# Patient Record
Sex: MALE | Race: WHITE | NOT HISPANIC OR LATINO | Employment: OTHER | URBAN - METROPOLITAN AREA
[De-identification: names, ages, dates, MRNs, and addresses within clinical notes are randomized per-mention and may not be internally consistent; named-entity substitution may affect disease eponyms.]

---

## 2022-02-26 ENCOUNTER — HOSPITAL ENCOUNTER (EMERGENCY)
Facility: HOSPITAL | Age: 72
Discharge: HOME | End: 2022-02-26
Attending: EMERGENCY MEDICINE
Payer: OTHER GOVERNMENT

## 2022-02-26 VITALS
DIASTOLIC BLOOD PRESSURE: 88 MMHG | HEART RATE: 60 BPM | SYSTOLIC BLOOD PRESSURE: 152 MMHG | RESPIRATION RATE: 16 BRPM | OXYGEN SATURATION: 98 % | WEIGHT: 165 LBS | TEMPERATURE: 98 F

## 2022-02-26 DIAGNOSIS — I10 HYPERTENSION: ICD-10-CM

## 2022-02-26 DIAGNOSIS — S60.221A CONTUSION OF RIGHT HAND, INITIAL ENCOUNTER: Primary | ICD-10-CM

## 2022-02-26 DIAGNOSIS — M19.031 ARTHRITIS OF RIGHT WRIST: ICD-10-CM

## 2022-02-26 LAB
ALBUMIN SERPL-MCNC: 4 G/DL (ref 3.3–5.5)
ALP SERPL-CCNC: 86 U/L (ref 42–141)
BILIRUB SERPL-MCNC: 0.7 MG/DL (ref 0.2–1.6)
BUN SERPL-MCNC: 11 MG/DL (ref 7–22)
CALCIUM SERPL-MCNC: 9.8 MG/DL (ref 8–10.3)
CHLORIDE SERPL-SCNC: 107 MMOL/L (ref 98–108)
CREAT SERPL-MCNC: 1.1 MG/DL (ref 0.6–1.2)
GLUCOSE SERPL-MCNC: 92 MG/DL (ref 73–118)
POC ALT (SGPT): 13 U/L (ref 10–47)
POC AST (SGOT): 28 U/L (ref 11–38)
POC CARDIAC TROPONIN I: 0 NG/ML
POC PTINR: 1 (ref 0.9–1.2)
POC PTWBT: 12.4 SEC (ref 9.7–14.3)
POC TCO2: 28 MMOL/L (ref 18–33)
POTASSIUM BLD-SCNC: 4.6 MMOL/L (ref 3.6–5.1)
PROTEIN, POC: 7.7 G/DL (ref 6.4–8.1)
SAMPLE: NORMAL
SAMPLE: NORMAL
SODIUM BLD-SCNC: 144 MMOL/L (ref 128–145)

## 2022-02-26 PROCEDURE — 85025 COMPLETE CBC W/AUTO DIFF WBC: CPT | Mod: ER

## 2022-02-26 PROCEDURE — 84484 ASSAY OF TROPONIN QUANT: CPT | Mod: ER

## 2022-02-26 PROCEDURE — 93005 ELECTROCARDIOGRAM TRACING: CPT | Mod: ER

## 2022-02-26 PROCEDURE — 99284 EMERGENCY DEPT VISIT MOD MDM: CPT | Mod: 25,ER

## 2022-02-26 PROCEDURE — 85610 PROTHROMBIN TIME: CPT | Mod: ER

## 2022-02-26 PROCEDURE — 80053 COMPREHEN METABOLIC PANEL: CPT | Mod: ER

## 2022-02-26 PROCEDURE — 25000003 PHARM REV CODE 250: Mod: ER | Performed by: EMERGENCY MEDICINE

## 2022-02-26 PROCEDURE — 93010 EKG 12-LEAD: ICD-10-PCS | Mod: ,,, | Performed by: INTERNAL MEDICINE

## 2022-02-26 PROCEDURE — 93010 ELECTROCARDIOGRAM REPORT: CPT | Mod: ,,, | Performed by: INTERNAL MEDICINE

## 2022-02-26 RX ORDER — DICLOFENAC SODIUM 10 MG/G
2 GEL TOPICAL 4 TIMES DAILY PRN
Qty: 200 G | Refills: 0 | Status: SHIPPED | OUTPATIENT
Start: 2022-02-26 | End: 2022-03-08

## 2022-02-26 RX ORDER — CYCLOBENZAPRINE HCL 10 MG
10 TABLET ORAL 3 TIMES DAILY PRN
Qty: 15 TABLET | Refills: 0 | Status: SHIPPED | OUTPATIENT
Start: 2022-02-26 | End: 2022-03-03

## 2022-02-26 RX ORDER — IBUPROFEN 600 MG/1
600 TABLET ORAL EVERY 6 HOURS PRN
Qty: 20 TABLET | Refills: 0 | Status: SHIPPED | OUTPATIENT
Start: 2022-02-26

## 2022-02-26 RX ORDER — ACETAMINOPHEN 500 MG
1000 TABLET ORAL EVERY 6 HOURS PRN
Qty: 30 TABLET | Refills: 0 | Status: SHIPPED | OUTPATIENT
Start: 2022-02-26

## 2022-02-26 RX ORDER — OXYCODONE AND ACETAMINOPHEN 5; 325 MG/1; MG/1
1 TABLET ORAL
Status: COMPLETED | OUTPATIENT
Start: 2022-02-26 | End: 2022-02-26

## 2022-02-26 RX ADMIN — OXYCODONE AND ACETAMINOPHEN 1 TABLET: 5; 325 TABLET ORAL at 12:02

## 2022-02-26 NOTE — ED PROVIDER NOTES
"Encounter Date: 2/26/2022    SCRIBE #1 NOTE: I, Barbara Victoria, am scribing for, and in the presence of,  Karla Aguilar DO. I have scribed the following portions of the note - Other sections scribed: HPI, ROS, PE.       History     Chief Complaint   Patient presents with    Hand Pain     Pt states," I hit my right hand about one hour ago. My right hand is swelling and it hurts."     Femi Christie is a 72 y.o. male who presents to the ED for chief complaint of acute pain and swelling to the dorsal aspect of his right hand onset 1 hour PTA. Patient was installing a tub when he hit his right hand and noticed a bump begin to form. The bump has grown significantly over the past hour. Denies any other symptoms. Denies any pertinent medical problems. Not on blood thinners. Denies smoking. Endorses drinking 6 pack of 12 oz beers daily. Has consumed two 12 oz cans today. No further complaint at this time.    The history is provided by the patient. No  was used.     Review of patient's allergies indicates:  No Known Allergies  No past medical history on file.  No past surgical history on file.  No family history on file.     Review of Systems   Constitutional: Negative for fever.   HENT: Negative for rhinorrhea and sore throat.    Respiratory: Negative for shortness of breath.    Cardiovascular: Negative for leg swelling.   Musculoskeletal: Positive for myalgias (R hand).        (+) Swelling (R hand)   Skin: Negative for rash.   Neurological: Negative for numbness.   All other systems reviewed and are negative.      Physical Exam     Initial Vitals [02/26/22 1208]   BP Pulse Resp Temp SpO2   (!) 163/95 (!) 59 16 98 °F (36.7 °C) 96 %      MAP       --         Patient gave consent to have physical exam performed.  Physical Exam    Nursing note and vitals reviewed.  Constitutional: He appears well-developed and well-nourished.   HENT:   Head: Normocephalic and atraumatic.   Right Ear: External ear normal. "   Left Ear: External ear normal.   Nose: Nose normal.   Mouth/Throat: Oropharynx is clear and moist.   Eyes: Conjunctivae and EOM are normal. Pupils are equal, round, and reactive to light.   Neck: Neck supple.   Normal range of motion.  Cardiovascular: Normal rate, regular rhythm and normal heart sounds. Exam reveals no gallop and no friction rub.    No murmur heard.  Pulmonary/Chest: Breath sounds normal. No respiratory distress. He has no wheezes. He has no rhonchi. He has no rales.   Abdominal: Abdomen is soft. Bowel sounds are normal. There is no abdominal tenderness. There is no rebound and no guarding.   Musculoskeletal:         General: No edema. Normal range of motion.      Right hand: Swelling (dorsum) and tenderness (dorsum) present.      Cervical back: Normal range of motion and neck supple.     Neurological: He is alert and oriented to person, place, and time. No cranial nerve deficit.   Skin: Skin is warm and dry. Capillary refill takes less than 2 seconds. No rash noted.   Psychiatric: He has a normal mood and affect. His behavior is normal.         ED Course   Procedures  Labs Reviewed   TROPONIN ISTAT   POCT CBC   POCT CMP   POCT TROPONIN   POCT PROTIME-INR   ISTAT PROCEDURE   POCT CMP             EKG Readings: (Independently Interpreted)   No STEMI. Rate of 56. Sinus Bradycardia. Normal Axis. Normal EKG. QTc normal at 399. No prior EKG for comparison.       Imaging Results          X-Ray Hand 3 view Right (Final result)  Result time 02/26/22 12:51:07    Final result by Matty Bautista MD (02/26/22 12:51:07)                 Narrative:    EXAMINATION:  XR HAND COMPLETE 3 VIEW RIGHT    CLINICAL HISTORY:  Injury right hand;    COMPARISON:  None.    FINDINGS:  Early SLAC wrist manifest by narrowing of the radiocarpal joint space with widening of scapholunate space.  No evidence for fracture or dislocation.  Mild subluxation level of the RIGHT 1st metacarpophalangeal joint.  Soft tissue swelling level  of the dorsum of the hand.      No evidence of fracture.Degenerative changes as above.  Soft tissue swelling dorsum.      Electronically signed by: Matty Bautista MD  Date:    02/26/2022  Time:    12:51                             X-Ray Chest PA And Lateral (Final result)  Result time 02/26/22 12:51:26    Final result by Matty Bautista MD (02/26/22 12:51:26)                 Impression:      No acute abnormality.      Electronically signed by: Matty Bautista MD  Date:    02/26/2022  Time:    12:51             Narrative:    EXAMINATION:  XR CHEST PA AND LATERAL    CLINICAL HISTORY:  htn;    TECHNIQUE:  PA and lateral views of the chest were performed.    COMPARISON:  None    FINDINGS:  The lungs are clear, with normal appearance of pulmonary vasculature.No pleural effusion.No pneumothorax.    The cardiac silhouette is normal in size. The hilar and mediastinal contours are unremarkable.    Bones are intact.                                 Medications   oxyCODONE-acetaminophen 5-325 mg per tablet 1 tablet (1 tablet Oral Given 2/26/22 1228)     Medical Decision Making:   History:   Old Medical Records: I decided to obtain old medical records.  Independently Interpreted Test(s):   I have ordered and independently interpreted EKG Reading(s) - see prior notes  Clinical Tests:   Lab Tests: Reviewed and Ordered  Radiological Study: Ordered and Reviewed  Medical Tests: Reviewed and Ordered        Chief complaint:  acute pain and swelling to the dorsal aspect of his right hand  Differential diagnosis: Strain, sprain, contusion, dislocation, fracture    Treatment in the ED: PE, X-ray, labs, Oxycodone-acetaminophen tablet  Patient reports feeling better after treatment in the ER.      Discussed treatment, prescriptions, labs, and imaging results.    Discharge home with Consult to ortho, Advil, Tylenol, Voltaren, Flexeril  Fill and take prescriptions as directed.  Return to the ED if symptoms worsen or do not resolve.    Answered questions and discussed discharge plan.    Patient feels better and is ready for discharge.  Follow up with PCP/specialist in 1 day.        Scribe Attestation:   Scribe #1: I performed the above scribed service and the documentation accurately describes the services I performed. I attest to the accuracy of the note.                I, Dr. Karla Aguilar, personally performed the services described in this documentation. This document was produced by a scribe under my direction and in my presence. All medical record entries made by the scribe were at my direction and in my presence.  I have reviewed the chart and agree that the record reflects my personal performance and is accurate and complete. Karla Aguilra DO.     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 Provider    ibuprofen (ADVIL,MOTRIN) 600 MG tablet Take 1 tablet (600 mg total) by mouth every 6 (six) hours as needed for Pain (Take with food as needed for mild-to-moderate pain). 20 tablet 2/26/2022  Karla Aguilar DO    acetaminophen (TYLENOL) 500 MG tablet Take 2 tablets (1,000 mg total) by mouth every 6 (six) hours as needed for Pain. 30 tablet 2/26/2022  Karla Aguilar DO    diclofenac sodium (VOLTAREN) 1 % Gel Apply 2 g topically 4 (four) times daily as needed (Apply to painful area up to 4 times a day as needed for pain). Apply to painful area 4 times a day as needed for pain 200 g 2/26/2022 3/8/2022 Karla Aguilar DO    cyclobenzaprine (FLEXERIL) 10 MG tablet Take 1 tablet (10 mg total) by mouth 3 (three) times daily as needed for Muscle spasms. 15 tablet 2/26/2022 3/3/2022 Karla Aguilar DO        Follow-up Information    None          Karla Aguilar DO  02/26/22 [id number]

## 2022-02-26 NOTE — Clinical Note
"Femi Christie (Samuel) was seen and treated in our emergency department on 2/26/2022.  He may return to work on 02/28/2022.       If you have any questions or concerns, please don't hesitate to call.      Karla Aguilar, DO"